# Patient Record
Sex: FEMALE | Race: BLACK OR AFRICAN AMERICAN | NOT HISPANIC OR LATINO | Employment: FULL TIME | URBAN - METROPOLITAN AREA
[De-identification: names, ages, dates, MRNs, and addresses within clinical notes are randomized per-mention and may not be internally consistent; named-entity substitution may affect disease eponyms.]

---

## 2019-04-27 ENCOUNTER — HOSPITAL ENCOUNTER (EMERGENCY)
Facility: HOSPITAL | Age: 44
Discharge: HOME | End: 2019-04-27
Attending: EMERGENCY MEDICINE
Payer: COMMERCIAL

## 2019-04-27 VITALS
RESPIRATION RATE: 15 BRPM | OXYGEN SATURATION: 100 % | HEART RATE: 60 BPM | HEIGHT: 60 IN | BODY MASS INDEX: 30.23 KG/M2 | WEIGHT: 154 LBS | TEMPERATURE: 98 F

## 2019-04-27 DIAGNOSIS — Z51.89 VISIT FOR WOUND CHECK: Primary | ICD-10-CM

## 2019-04-27 PROCEDURE — 99282 EMERGENCY DEPT VISIT SF MDM: CPT

## 2019-04-27 PROCEDURE — 99282 EMERGENCY DEPT VISIT SF MDM: CPT | Performed by: PHYSICIAN ASSISTANT

## 2019-04-27 RX ORDER — FOLIC ACID 1 MG/1
1 TABLET ORAL DAILY
COMMUNITY

## 2020-12-23 ENCOUNTER — APPOINTMENT (EMERGENCY)
Dept: RADIOLOGY | Facility: HOSPITAL | Age: 45
End: 2020-12-23
Payer: COMMERCIAL

## 2020-12-23 ENCOUNTER — HOSPITAL ENCOUNTER (EMERGENCY)
Facility: HOSPITAL | Age: 45
Discharge: HOME | End: 2020-12-23
Attending: EMERGENCY MEDICINE
Payer: COMMERCIAL

## 2020-12-23 VITALS
WEIGHT: 158 LBS | HEART RATE: 72 BPM | OXYGEN SATURATION: 98 % | BODY MASS INDEX: 30.86 KG/M2 | TEMPERATURE: 101.6 F | RESPIRATION RATE: 20 BRPM | DIASTOLIC BLOOD PRESSURE: 78 MMHG | SYSTOLIC BLOOD PRESSURE: 131 MMHG

## 2020-12-23 DIAGNOSIS — U07.1 COVID-19: Primary | ICD-10-CM

## 2020-12-23 LAB
ALBUMIN SERPL BCP-MCNC: 2.9 G/DL (ref 3.5–5)
ALP SERPL-CCNC: 50 U/L (ref 46–116)
ALT SERPL W P-5'-P-CCNC: 40 U/L (ref 12–78)
ANION GAP SERPL CALCULATED.3IONS-SCNC: 7 MMOL/L (ref 4–13)
AST SERPL W P-5'-P-CCNC: 51 U/L (ref 5–45)
BASOPHILS # BLD AUTO: 0.01 THOUSANDS/ΜL (ref 0–0.1)
BASOPHILS NFR BLD AUTO: 0 % (ref 0–1)
BILIRUB SERPL-MCNC: 0.5 MG/DL (ref 0.2–1)
BUN SERPL-MCNC: 8 MG/DL (ref 5–25)
CALCIUM ALBUM COR SERPL-MCNC: 9.1 MG/DL (ref 8.3–10.1)
CALCIUM SERPL-MCNC: 8.2 MG/DL (ref 8.3–10.1)
CHLORIDE SERPL-SCNC: 105 MMOL/L (ref 100–108)
CO2 SERPL-SCNC: 27 MMOL/L (ref 21–32)
CREAT SERPL-MCNC: 1.13 MG/DL (ref 0.6–1.3)
EOSINOPHIL # BLD AUTO: 0 THOUSAND/ΜL (ref 0–0.61)
EOSINOPHIL NFR BLD AUTO: 0 % (ref 0–6)
ERYTHROCYTE [DISTWIDTH] IN BLOOD BY AUTOMATED COUNT: 14.4 % (ref 11.6–15.1)
GFR SERPL CREATININE-BSD FRML MDRD: 67 ML/MIN/1.73SQ M
GLUCOSE SERPL-MCNC: 96 MG/DL (ref 65–140)
HCT VFR BLD AUTO: 35.8 % (ref 34.8–46.1)
HGB BLD-MCNC: 12.1 G/DL (ref 11.5–15.4)
IMM GRANULOCYTES # BLD AUTO: 0.02 THOUSAND/UL (ref 0–0.2)
IMM GRANULOCYTES NFR BLD AUTO: 0 % (ref 0–2)
LYMPHOCYTES # BLD AUTO: 1.1 THOUSANDS/ΜL (ref 0.6–4.47)
LYMPHOCYTES NFR BLD AUTO: 24 % (ref 14–44)
MCH RBC QN AUTO: 31.5 PG (ref 26.8–34.3)
MCHC RBC AUTO-ENTMCNC: 33.8 G/DL (ref 31.4–37.4)
MCV RBC AUTO: 93 FL (ref 82–98)
MONOCYTES # BLD AUTO: 0.42 THOUSAND/ΜL (ref 0.17–1.22)
MONOCYTES NFR BLD AUTO: 9 % (ref 4–12)
NEUTROPHILS # BLD AUTO: 3.08 THOUSANDS/ΜL (ref 1.85–7.62)
NEUTS SEG NFR BLD AUTO: 67 % (ref 43–75)
NRBC BLD AUTO-RTO: 0 /100 WBCS
PLATELET # BLD AUTO: 219 THOUSANDS/UL (ref 149–390)
PMV BLD AUTO: 9.5 FL (ref 8.9–12.7)
POTASSIUM SERPL-SCNC: 3.7 MMOL/L (ref 3.5–5.3)
PROT SERPL-MCNC: 8.5 G/DL (ref 6.4–8.2)
RBC # BLD AUTO: 3.84 MILLION/UL (ref 3.81–5.12)
SODIUM SERPL-SCNC: 139 MMOL/L (ref 136–145)
TROPONIN I SERPL-MCNC: <0.02 NG/ML
WBC # BLD AUTO: 4.63 THOUSAND/UL (ref 4.31–10.16)

## 2020-12-23 PROCEDURE — 80053 COMPREHEN METABOLIC PANEL: CPT | Performed by: EMERGENCY MEDICINE

## 2020-12-23 PROCEDURE — 93005 ELECTROCARDIOGRAM TRACING: CPT

## 2020-12-23 PROCEDURE — 36415 COLL VENOUS BLD VENIPUNCTURE: CPT | Performed by: EMERGENCY MEDICINE

## 2020-12-23 PROCEDURE — 71045 X-RAY EXAM CHEST 1 VIEW: CPT

## 2020-12-23 PROCEDURE — 99285 EMERGENCY DEPT VISIT HI MDM: CPT

## 2020-12-23 PROCEDURE — 84484 ASSAY OF TROPONIN QUANT: CPT | Performed by: EMERGENCY MEDICINE

## 2020-12-23 PROCEDURE — 99284 EMERGENCY DEPT VISIT MOD MDM: CPT | Performed by: EMERGENCY MEDICINE

## 2020-12-23 PROCEDURE — 85025 COMPLETE CBC W/AUTO DIFF WBC: CPT | Performed by: EMERGENCY MEDICINE

## 2020-12-23 RX ORDER — ACETAMINOPHEN 325 MG/1
975 TABLET ORAL ONCE
Status: COMPLETED | OUTPATIENT
Start: 2020-12-23 | End: 2020-12-23

## 2020-12-23 RX ORDER — DOXYCYCLINE HYCLATE 100 MG/1
100 CAPSULE ORAL 2 TIMES DAILY
Qty: 14 CAPSULE | Refills: 0 | Status: SHIPPED | OUTPATIENT
Start: 2020-12-23 | End: 2020-12-30

## 2020-12-23 RX ADMIN — ACETAMINOPHEN 975 MG: 325 TABLET, FILM COATED ORAL at 17:16

## 2020-12-24 ENCOUNTER — TELEMEDICINE (OUTPATIENT)
Dept: INTERNAL MEDICINE CLINIC | Facility: CLINIC | Age: 45
End: 2020-12-24
Payer: COMMERCIAL

## 2020-12-24 ENCOUNTER — TELEPHONE (OUTPATIENT)
Dept: INTERNAL MEDICINE CLINIC | Facility: CLINIC | Age: 45
End: 2020-12-24

## 2020-12-24 VITALS — WEIGHT: 158 LBS | BODY MASS INDEX: 29.83 KG/M2 | HEIGHT: 61 IN

## 2020-12-24 DIAGNOSIS — U07.1 COVID-19: Primary | ICD-10-CM

## 2020-12-24 PROBLEM — M17.0 OSTEOARTHRITIS OF BOTH KNEES: Status: ACTIVE | Noted: 2019-03-07

## 2020-12-24 LAB
ATRIAL RATE: 79 BPM
P AXIS: 59 DEGREES
PR INTERVAL: 148 MS
QRS AXIS: 43 DEGREES
QRSD INTERVAL: 74 MS
QT INTERVAL: 360 MS
QTC INTERVAL: 412 MS
T WAVE AXIS: 58 DEGREES
VENTRICULAR RATE: 79 BPM

## 2020-12-24 PROCEDURE — 99213 OFFICE O/P EST LOW 20 MIN: CPT | Performed by: INTERNAL MEDICINE

## 2020-12-24 PROCEDURE — 3008F BODY MASS INDEX DOCD: CPT | Performed by: INTERNAL MEDICINE

## 2020-12-24 PROCEDURE — 93010 ELECTROCARDIOGRAM REPORT: CPT | Performed by: INTERNAL MEDICINE

## 2020-12-24 PROCEDURE — 3725F SCREEN DEPRESSION PERFORMED: CPT | Performed by: INTERNAL MEDICINE

## 2020-12-28 ENCOUNTER — TELEPHONE (OUTPATIENT)
Dept: INTERNAL MEDICINE CLINIC | Facility: CLINIC | Age: 45
End: 2020-12-28

## 2020-12-28 ENCOUNTER — TELEMEDICINE (OUTPATIENT)
Dept: INTERNAL MEDICINE CLINIC | Facility: CLINIC | Age: 45
End: 2020-12-28
Payer: COMMERCIAL

## 2020-12-28 DIAGNOSIS — U07.1 COVID-19: Primary | ICD-10-CM

## 2020-12-28 PROCEDURE — 99213 OFFICE O/P EST LOW 20 MIN: CPT | Performed by: INTERNAL MEDICINE

## 2021-01-04 ENCOUNTER — TELEPHONE (OUTPATIENT)
Dept: INTERNAL MEDICINE CLINIC | Facility: CLINIC | Age: 46
End: 2021-01-04

## 2021-01-04 NOTE — TELEPHONE ENCOUNTER
Patient is faxing a form that needs to be completed by Dr Enrcio Bro for her return to work      Also patient needs the last letter that was done for her re-faxed to [phone]

## 2021-01-06 ENCOUNTER — TELEPHONE (OUTPATIENT)
Dept: INTERNAL MEDICINE CLINIC | Facility: CLINIC | Age: 46
End: 2021-01-06

## 2021-03-03 ENCOUNTER — TELEPHONE (OUTPATIENT)
Dept: INTERNAL MEDICINE CLINIC | Facility: CLINIC | Age: 46
End: 2021-03-03

## 2021-03-03 NOTE — TELEPHONE ENCOUNTER
Pt has trouble sleeping, fatigue  First available appt 4/13/2021 to establish  FYI The pt has had 2 virtual appt due to Covid in December

## 2021-03-03 NOTE — TELEPHONE ENCOUNTER
[id number] Emi Pierson will have to discuss this at appointment as it could be a persistent effect from covid

## 2021-04-13 ENCOUNTER — OFFICE VISIT (OUTPATIENT)
Dept: INTERNAL MEDICINE CLINIC | Facility: CLINIC | Age: 46
End: 2021-04-13
Payer: COMMERCIAL

## 2021-04-13 VITALS
TEMPERATURE: 98 F | DIASTOLIC BLOOD PRESSURE: 78 MMHG | BODY MASS INDEX: 31.19 KG/M2 | WEIGHT: 165.2 LBS | OXYGEN SATURATION: 99 % | HEART RATE: 74 BPM | HEIGHT: 61 IN | SYSTOLIC BLOOD PRESSURE: 118 MMHG

## 2021-04-13 DIAGNOSIS — Z86.16 PERSONAL HISTORY OF COVID-19: ICD-10-CM

## 2021-04-13 DIAGNOSIS — M05.79 RHEUMATOID ARTHRITIS INVOLVING MULTIPLE SITES WITH POSITIVE RHEUMATOID FACTOR (HCC): Primary | ICD-10-CM

## 2021-04-13 PROCEDURE — 3008F BODY MASS INDEX DOCD: CPT | Performed by: INTERNAL MEDICINE

## 2021-04-13 PROCEDURE — 99214 OFFICE O/P EST MOD 30 MIN: CPT | Performed by: INTERNAL MEDICINE

## 2021-04-13 PROCEDURE — 1036F TOBACCO NON-USER: CPT | Performed by: INTERNAL MEDICINE

## 2021-04-13 RX ORDER — FAMCICLOVIR 500 MG/1
TABLET, FILM COATED ORAL
COMMUNITY
Start: 2021-02-17

## 2021-04-13 NOTE — PROGRESS NOTES
[facility]    NAME: Renetta Collins  AGE: 48 y o  SEX: female  : 1975     DATE: 2021     Assessment and Plan:     1  Rheumatoid arthritis involving multiple sites with positive rheumatoid factor ([address] )    Follow-up with rheumatology as scheduled  Continue MTX  Disease appears to be stable at this time  Check labs before appointment with rheumatology  - CBC; Future  - Comprehensive metabolic panel; Future  - Sedimentation rate, automated; Future  - C-reactive protein; Future  - Folate; Future  - RF Screen w/ Reflex to Titer; Future  - EKLLI Screen w/ Reflex to Titer/Pattern; Future    2  Personal history of COVID-19    She has recovered well from COVID-19 and was vaccinated with J [employer] J vaccine in beginning of March  BMI Counseling: Body mass index is 31 21 kg/m²  The BMI is above normal  Nutrition recommendations include decreasing portion sizes, encouraging healthy choices of fruits and vegetables, limiting drinks that contain sugar, moderation in carbohydrate intake and increasing intake of lean protein  Exercise recommendations include exercising 3-5 times per week  Return in about 1 year (around 2022) for Annual Physical      Chief Complaint:     Chief Complaint   Patient presents with    [facility]        History of Present Illness:     Patient being seen for first time in office  Was previously seen during telemedicine visit due to COVID-19  She has recovered well from COVID-19 and got the J & J vaccine in beginning of March  No problems with the vaccine other then arm soreness  She follows with rheumatology in [place]  Transferring to Dr Alma Rosa Byrne in  at [facility]  Currently maintained on MTX  + Anti-CCP in the past and +KELLI from labs at DR GIRISH WARREN [facility]  Denies any worsening joint pain or joint swelling       Review of Systems:     Review of Systems   Constitutional: Negative for activity change, appetite change and fatigue  Respiratory: Negative for apnea, cough, chest tightness, shortness of breath and wheezing  Cardiovascular: Negative for chest pain, palpitations and leg swelling  Gastrointestinal: Negative for abdominal distention, abdominal pain, blood in stool, constipation, diarrhea, nausea and vomiting  Musculoskeletal: Positive for arthralgias  Negative for back pain, gait problem, joint swelling and myalgias  Neurological: Negative for dizziness, weakness, light-headedness, numbness and headaches  Psychiatric/Behavioral: Negative for behavioral problems, confusion, hallucinations, sleep disturbance and suicidal ideas  The patient is not nervous/anxious  Objective:     /78   Pulse 74   Temp 98 °F (36 7 °C)   Ht 5' 1" (1 549 m)   Wt 74 9 kg (165 lb 3 2 oz)   SpO2 99%   BMI 31 21 kg/m²     Physical Exam  Vitals signs reviewed  Constitutional:       General: She is not in acute distress  Appearance: She is well-developed  She is not diaphoretic  Eyes:      General: No scleral icterus  Right eye: No discharge  Left eye: No discharge  Conjunctiva/sclera: Conjunctivae normal    Neck:      Musculoskeletal: Neck supple  Thyroid: No thyromegaly  Vascular: No JVD  Cardiovascular:      Rate and Rhythm: Normal rate and regular rhythm  Heart sounds: Normal heart sounds  No murmur  Pulmonary:      Effort: Pulmonary effort is normal  No respiratory distress  Breath sounds: Normal breath sounds  No wheezing or rales  Abdominal:      General: Bowel sounds are normal  There is no distension  Palpations: Abdomen is soft  Tenderness: There is no abdominal tenderness  Musculoskeletal:         General: Deformity (knee crepitus) present  Right lower leg: No edema  Left lower leg: No edema  Lymphadenopathy:      Cervical: No cervical adenopathy  Skin:     General: Skin is warm and dry     Neurological:      Mental Status: She is alert    Psychiatric:         Mood and Affect: Mood normal          Behavior: Behavior normal        Marnie Sweeney   MEDICAL [address]

## 2021-04-13 NOTE — PATIENT INSTRUCTIONS
Rheumatoid Arthritis   WHAT YOU NEED TO KNOW:   Rheumatoid arthritis (RA) is a long-term autoimmune disease that causes inflammation and damage to your joints  RA causes your body's immune system to attack the synovial membrane (lining) in your joints  RA can also affect other organs, such as your eyes, heart, or lungs  It may also increase your risk of osteoporosis (weakened bones)  DISCHARGE INSTRUCTIONS:   Call your doctor or rheumatologist if:   · You have a fever  · You have increased joint swelling, pain, or redness  · Your skin is itchy, swollen, or has a rash  · Your symptoms are getting worse, even with treatment  · You have questions or concerns about your condition or care  Medicines:   · Antirheumatics  help slow the progress of RA, and reduce pain, stiffness, and inflammation  · NSAIDs , such as ibuprofen, help decrease swelling, pain, and fever  NSAIDs can cause stomach bleeding or kidney problems in certain people  If you take blood thinner medicine, always ask your healthcare provider if NSAIDs are safe for you  Always read the medicine label and follow directions  · Biologic therapy  helps your immune system fight the disease  These medicines increase the risk of serious infection and need careful monitoring  · Steroid medicine  helps reduce swelling and pain  · Take your medicine as directed  Contact your healthcare provider if you think your medicine is not helping or if you have side effects  Tell him of her if you are allergic to any medicine  Keep a list of the medicines, vitamins, and herbs you take  Include the amounts, and when and why you take them  Bring the list or the pill bottles to follow-up visits  Carry your medicine list with you in case of an emergency  Follow up with your healthcare provider or rheumatologist as directed:  Write down your questions so you remember to ask them during your visits  Manage your symptoms:   · Rest when needed    Rest is important if your joints are painful  Limit your activities until your symptoms improve  Gradually start your normal activities when you can do them without pain  Avoid motions and activities that cause strain on your joints, such as heavy exercise and lifting  · Use ice or heat  Both can help decrease swelling and pain  Ice may also help prevent tissue damage  Use an ice pack, or put crushed ice in a plastic bag  Cover it with a towel and place it on your joint for 15 to 20 minutes every hour or as directed  You can apply heat for 20 minutes every 2 hours  Heat treatment includes hot packs, heat lamps, warm baths, or showers  · Elevate your joint  Elevation helps reduce swelling and pain  Raise your joint above the level of your heart as often as you can  Prop your painful joint on pillows to keep it above your heart comfortably  Manage RA:   · Talk to your healthcare providers about your arthritis medicines  Some medicines may only be needed when you have arthritis pain  You may need to take other medicines every day to prevent arthritis from getting worse  Your healthcare providers will help you understand all your medicines and when to take them  It is important to take the medicines as directed, even if you start to feel better  You can continue to have joint damage and inflammation even if you do not feel it  · Eat a variety of healthy foods  Healthy foods include fruits, vegetables, whole-grain breads, low-fat dairy products, beans, lean meats, and fish  Ask if you need to be on a special diet  A diet rich in calcium and vitamin D may decrease your risk of osteoporosis  Foods high in calcium include milk, cheese, broccoli, and tofu  Vitamin D may be found in meat, fish, fortified milk, cereal and bread  Ask if you need calcium or vitamin D supplements  · Maintain a healthy weight  This may help decrease strain on joints in your back, knees, ankles, and feet   Ask your healthcare provider how much you should weigh  Ask him or her to help you create a weight loss plan if you are overweight  Exercise can help you maintain a healthy weight  · Go to physical or occupational therapy as directed  Physical activity can help relieve pain and stiffness  A physical therapist can teach you exercises to improve flexibility and range of motion  You may also be shown non-weight-bearing exercises that are safe for your joints, such as swimming  An occupational therapist can help you learn to do your daily activities when your joints are stiff or sore  · Do not smoke  Nicotine and other chemicals in cigarettes and cigars can damage your bones and joints  Ask your healthcare provider for information if you currently smoke and need help to quit  E-cigarettes or smokeless tobacco still contain nicotine  Talk to your healthcare provider before you use these products  RA medicines and pregnancy:   · Men:  Talk to your doctor about your RA and the medicines you take  Some medicines may keep your partner from getting pregnant  Talk to your doctor if you and your partner are discussing pregnancy  · Women:  Talk to your gynecologist about contraceptives  Tell him or her about your RA and what medicines you take  He or she will tell you what the best contraceptive for will be  Not all contraceptives are effective if you have RA and are taking certain medicines  You may not be able to breastfeed if you are taking certain RA medicines  Support devices to help manage arthritis:   · Orthotic shoes or insoles  help support your feet when you walk  · Crutches, a cane, or a walker  may help decrease your risk for falling  They also decrease stress on affected joints  · Devices to prevent falls  include raised toilet seats and bathtub bars to help you get up from sitting  Handrails can be placed in areas where you need balance and support      · Devices to help with support and rest  include splints to wear on your hands and a firm pillow while you sleep  Use a pillow that is firm enough to support your neck and head  Ask your healthcare provider about vaccines: You may be at an increased risk for infections due to RA and its treatment  Vaccines may prevent infections from various viruses  © Copyright [address] Information is for End User's use only and may not be sold, redistributed or otherwise used for commercial purposes  All illustrations and images included in CareNotes® are the copyrighted property of A D A M , Inc  or [facility] Yissel Mata   The above information is an  only  It is not intended as medical advice for individual conditions or treatments  Talk to your doctor, nurse or pharmacist before following any medical regimen to see if it is safe and effective for you

## 2021-06-10 ENCOUNTER — OFFICE VISIT (OUTPATIENT)
Dept: INTERNAL MEDICINE CLINIC | Facility: CLINIC | Age: 46
End: 2021-06-10
Payer: COMMERCIAL

## 2021-06-10 VITALS
SYSTOLIC BLOOD PRESSURE: 116 MMHG | HEART RATE: 73 BPM | BODY MASS INDEX: 31.45 KG/M2 | DIASTOLIC BLOOD PRESSURE: 76 MMHG | HEIGHT: 61 IN | WEIGHT: 166.6 LBS | TEMPERATURE: 97.6 F | OXYGEN SATURATION: 98 %

## 2021-06-10 DIAGNOSIS — R10.9 STOMACH PAIN: Primary | ICD-10-CM

## 2021-06-10 PROCEDURE — 3008F BODY MASS INDEX DOCD: CPT | Performed by: INTERNAL MEDICINE

## 2021-06-10 PROCEDURE — 99213 OFFICE O/P EST LOW 20 MIN: CPT | Performed by: NURSE PRACTITIONER

## 2021-06-10 RX ORDER — PANTOPRAZOLE SODIUM 40 MG/1
40 TABLET, DELAYED RELEASE ORAL
Qty: 30 TABLET | Refills: 2 | Status: SHIPPED | OUTPATIENT
Start: 2021-06-10 | End: 2021-09-01

## 2021-06-10 RX ORDER — SUCRALFATE ORAL 1 G/10ML
1 SUSPENSION ORAL 4 TIMES DAILY
Qty: 420 ML | Refills: 0 | Status: SHIPPED | OUTPATIENT
Start: 2021-06-10 | End: 2021-10-19 | Stop reason: SDUPTHER

## 2021-06-10 NOTE — PROGRESS NOTES
INTERNAL MEDICINE FOLLOW-UP OFFICE VISIT  [facility]    NAME: Devyn Dorantes  AGE: 48 y o  SEX: female    DATE OF ENCOUNTER: 6/10/2021   Assessment and Plan:   Patient with symptoms of gastric ulcer  Will start pantoprazole daily and carafate qid prn  Advised if no improvement in 1-2 weeks please call back for GI referral for possible EGD  Advised to monitor fruit intake to see if any specific foods trigger her symptoms more than others  Problem List Items Addressed This Visit     None      Visit Diagnoses     Stomach pain    -  Primary    Relevant Medications    pantoprazole (PROTONIX) 40 mg tablet    sucralfate (CARAFATE) 1 g/10 mL suspension          No follow-ups on file  Counseling:     · Medication Side Effects - Adverse side effects of medications were reviewed with the patient/guardian today: Yes  · Counseling was given regarding: Prognosis, Risks and benefits of tx options, Intructions for management, Patient and family education, Importance of tx compliance, Risk factor reductions and Impressions  · Barriers to treatment include: No identified barriers      Chief Complaint:     Chief Complaint   Patient presents with    GI Problem     gas pain when she eats on and off  has been going on for a week        History of Present Illness:     Has had stomach pains for the past week and not sure what is causing this  States she gained about 20 lbs during covid and is trying to lose weight  She has been eating a lot more fruits lately, apples, grape, blueberries, tangerines  She denies spicy foods or excess caffeine  She states she did go back to work full time and is still working a part time job but denies significant stress  States when she eats she gets severe discomfort in her stomach  Has tried drinking ginger ale and gas x tablets without relief  States sometimes she bends down on all fours to relieve the pain   Denies acid reflux, no n/v/d, or constipation  Had fever of 102 and chills for one night two days ago but then this resolved  Was unsure if she could get covid again since she had this in December and recently had her vaccine in march of this year  Feels otherwise well today and fever has not returned  The following portions of the patient's history were reviewed and updated as appropriate: allergies, current medications, past family history, past medical history, past social history, past surgical history and problem list      Review of Systems:     Review of Systems   Constitutional: Negative for chills, fatigue and fever  Respiratory: Negative for shortness of breath  Cardiovascular: Negative for chest pain  Gastrointestinal: Positive for abdominal pain  Negative for abdominal distention, constipation, diarrhea, nausea and vomiting  Genitourinary: Negative for dysuria  Musculoskeletal: Negative for back pain  Problem List:     Patient Active Problem List   Diagnosis    Rheumatoid arthritis ([address] )    Osteoarthritis of both knees        Objective:     /76 (BP Location: Left arm, Patient Position: Sitting, Cuff Size: Standard)   Pulse 73   Temp 97 6 °F (36 4 °C) Comment: no nsaids  Ht 5' 1" (1 549 m)   Wt 75 6 kg (166 lb 9 6 oz)   SpO2 98%   BMI 31 48 kg/m²     Physical Exam  Vitals signs reviewed  Constitutional:       General: She is not in acute distress  Appearance: Normal appearance  She is not ill-appearing  HENT:      Head: Normocephalic and atraumatic  Cardiovascular:      Rate and Rhythm: Normal rate and regular rhythm  Heart sounds: Normal heart sounds, S1 normal and S2 normal    Pulmonary:      Effort: Pulmonary effort is normal  No accessory muscle usage  Breath sounds: Normal breath sounds  No wheezing  Abdominal:      General: Abdomen is flat  Bowel sounds are increased  Palpations: Abdomen is soft  Tenderness: There is no guarding        Hernia: No hernia is present  Skin:     General: Skin is warm and dry  Capillary Refill: Capillary refill takes less than 2 seconds  Findings: No rash  Neurological:      General: No focal deficit present  Mental Status: She is alert and oriented to person, place, and time  Motor: Motor function is intact  Psychiatric:         Attention and Perception: Attention and perception normal          Mood and Affect: Mood and affect normal          Speech: Speech normal          Behavior: Behavior normal  Behavior is cooperative  Thought Content: Thought content normal          Pertinent Laboratory/Diagnostic Studies:    Laboratory Results: I have personally reviewed the pertinent laboratory results/reports   Radiology/Other Diagnostic Testing Results: I have personally reviewed pertinent reports  Current Medications:     Current Outpatient Medications   Medication Sig Dispense Refill    Diclofenac Sodium (VOLTAREN) 1 % Apply 2 g topically 4 (four) times a day as needed      famciclovir (FAMVIR) 500 mg tablet TAKE 2 TABLETS BY MOUTH IN THE AM AND 2 TABS BY MOUTH AT PM FOR ONE DAY THEN REPEAT AS NEEDED      folic acid (FOLVITE) 1 mg tablet Take 1 mg by mouth daily      methotrexate 2 5 mg tablet Take 2 5 mg by mouth once a week      pantoprazole (PROTONIX) 40 mg tablet Take 1 tablet (40 mg total) by mouth daily before breakfast 30 tablet 2    sucralfate (CARAFATE) 1 g/10 mL suspension Take 10 mL (1 g total) by mouth 4 (four) times a day 420 mL 0     No current facility-administered medications for this visit  Patient Instructions   Start taking pantoprazole daily, if no improvement in 1-2 weeks please call for GI referral  Can take carafate 4 times daily before meals to help with stomach pains         SALVADOR Hilton  [facility]

## 2021-06-10 NOTE — LETTER
Esperanza 10, 2021     Patient: Florence Galvez   YOB: 1975   Date of Visit: 6/10/2021       To Whom it May Concern:    Florence Galvez is under my professional care  She was seen in my office on 6/10/2021  She may return to work on 6/11/2021  If you have any questions or concerns, please don't hesitate to call           Sincerely,          SALVADOR Nails

## 2021-06-13 ENCOUNTER — TELEPHONE (OUTPATIENT)
Dept: RHEUMATOLOGY | Facility: CLINIC | Age: 46
End: 2021-06-13

## 2021-06-13 NOTE — TELEPHONE ENCOUNTER
Patient recently saw her rheum and has a follow up scheduled with them - please check if she is still seeing me as a new patient, thanks

## 2021-06-14 NOTE — PROGRESS NOTES
Assessment and Plan:   Ms Deepthi Og is a 78-year-old female with history significant for seropositive rheumatoid arthritis (rheumatoid factor of 25 and anti CCP antibody greater than 250), positive KELLI 1:320 nucleolar pattern and SSA antibody greater than 8, who presents to establish with [facility]umat[facility]  She is currently on methotrexate 15 mg once weekly  She is transferring care from DR GIRISH WARREN [facility] Rhe[facility]ology  She is referred today by Dr Skye Sweet for a rheumatology consult  - [address] presents today to establish with [facility] for continued management of seropositive rheumatoid arthritis with which she was diagnosed in approximately 2015  She has been well managed with oral methotrexate at 15 mg once weekly since the time of her diagnosis and denies any symptoms that would be concerning for a flare-up of the rheumatoid arthritis  Her physical examination is also unrevealing today with absence of synovitis  Overall she appears to be in remission and we will continue with the methotrexate at 15 mg once weekly with folic acid 1 mg daily  She is due for high risk medication lab monitoring today and will repeat this again in 4 months  - Based on records from her prior rheumatologist she was also found to have a positive KELLI nucleolar pattern and high titer SSA antibody  She does not report features that would be concerning for an underlying connective tissue disorder except for the dry eyes which may also be secondary to contact lens use  We will continue to monitor this for now  Plan:  Diagnoses and all orders for this visit:    Seropositive rheumatoid arthritis ([facility] [address] )  -     methotrexate 2 5 mg tablet; Take 6 tablets (15 mg total) by mouth once a week    Methotrexate, long term, current use  -     CBC and differential; Future  -     Comprehensive metabolic panel; Future  -     C-reactive protein; Future  -     Sedimentation rate, automated;  Future  -     Chronic Hepatitis Panel; Future  -     CBC and differential; Future  -     Comprehensive metabolic panel; Future    Positive KELLI (antinuclear antibody)    SS-A antibody positive    History of COVID-19    Other orders  -     Cancel: Quantiferon TB Gold Plus; Future  -     guaifenesin-codeine (GUAIFENESIN AC) 100-10 MG/5ML liquid; Virtussin AC 10 mg-100 mg/5 mL oral liquid      I have personally reviewed pertinent films in PACS of the CXR which does not show any osseous abnormalities  Activities as tolerated  Exercise: try to maintain a low impact exercise regimen as much as possible  Walk for 30 minutes a day for at least 3 days a week  Continue other medications as prescribed by PCP and other specialists  RTC in 8 months  HPI  Ms Kennedy Segovia is a 40-year-old female with history significant for seropositive rheumatoid arthritis (rheumatoid factor of 25 and anti CCP antibody greater than 250), positive KELLI 1:320 nucleolar pattern and SSA antibody greater than 8, who presents to establish with [facility] Rheumat[facility]  She is currently on methotrexate 15 mg once weekly  She is transferring care from DR GIRISH WARREN [facility] Rheumatology  She is referred today by Dr Nidhi Nix for a rheumatology consult  Patient reports she was diagnosed with rheumatoid arthritis approximately 6 years ago and was started on oral methotrexate as the initial option  She has been maintained on methotrexate at 15 mg once weekly since her diagnosis and reports that this significantly helps with her symptoms  She is currently denying any joint pains, swelling or morning stiffness  She has been tolerating the methotrexate well without any concerns for side effects or infections  She reports due to the pandemic she was unable to schedule a follow-up with her rheumatologist and temporarily was off the medication for 3 months due to a lack of refills    During this time she noticed worsening of her joint pains which rapidly improved after restarting the methotrexate  She will take prednisone very infrequently for such occasions and this helps with her symptoms  She does not utilize any over-the-counter pain medications  She denies fevers, unintentional weight loss, inflammatory eye disease (she does report dry eyes for which she uses topical lubricants as needed), dry mouth, skin rash, psoriasis, photosensitivity (reports at times with sun exposure she may develop a prickly heat type rash on her arms), skin thickening/tightening, mouth/nose ulcers, swollen glands, dysphagia, persistent GERD, pleuritic chest pain, shortness of breath, inflammatory bowel disease, blood clots, miscarriages or Raynaud's  She reports a history of rheumatoid arthritis in a sister and lupus in a sister  The following portions of the patient's history were reviewed and updated as appropriate: allergies, current medications, past family history, past medical history, past social history, past surgical history and problem list       Review of Systems  Constitutional: Negative for weight change, fevers, chills, night sweats, fatigue  ENT/Mouth: Negative for hearing changes, ear pain, nasal congestion, sinus pain, hoarseness, sore throat, rhinorrhea, swallowing difficulty  Eyes: Negative for pain, redness, discharge, vision changes  Cardiovascular: Negative for chest pain, SOB, palpitations  Respiratory: Negative for cough, sputum, wheezing, dyspnea  Gastrointestinal: Negative for nausea, vomiting, diarrhea, constipation, pain, heartburn  Genitourinary: Negative for dysuria, urinary frequency, hematuria  Musculoskeletal: As per HPI  Skin: Negative for skin rash, color changes  Neuro: Negative for weakness, numbness, tingling, loss of consciousness  Psych: Negative for anxiety, depression  Heme/Lymph: Negative for easy bruising, bleeding, lymphadenopathy          Past Medical History:   Diagnosis Date    [facility] Past Surgical History:   Procedure Laterality Date    BRACHIOPLASTY         Social History     Socioeconomic History    Marital status: Single     Spouse name: Not on file    Number of children: Not on file    Years of education: Not on file    Highest education level: Not on file   Occupational History    Not on file   Tobacco Use    Smoking status: Never Smoker    Smokeless tobacco: Never Used   Vaping Use    Vaping Use: Never used   Substance and Sexual Activity    Alcohol use: Not Currently    Drug use: Not Currently    Sexual activity: Not Currently   Other Topics Concern    Not on file   Social History Narrative    Not on file     Social Determinants of Health     Financial Resource Strain:     Difficulty of Paying Living Expenses:    Food Insecurity:     Worried About Running Out of Food in the Last Year:     [address] in the Last Year:    Transportation Needs:     Lack of Transportation (Medical):      Lack of Transportation (Non-Medical):    Physical Activity: Inactive    Days of Exercise per Week: 0 days    Minutes of Exercise per Session: 0 min   Stress: No Stress Concern Present    Feeling of Stress : Not at all   Social Connections:     Frequency of Communication with Friends and Family:     Frequency of Social Gatherings with Friends and Family:     Attends Sabianist Services:     Active Member of Clubs or Organizations:     Attends Club or Organization Meetings:     Marital Status:    Intimate Partner Violence:     Fear of Current or Ex-Partner:     Emotionally Abused:     Physically Abused:     Sexually Abused:        Family History   Problem Relation Age of Onset    Hypertension Mother        No Known Allergies      Current Outpatient Medications:     Diclofenac Sodium (VOLTAREN) 1 %, Apply 2 g topically 4 (four) times a day as needed, Disp: , Rfl:     famciclovir (FAMVIR) 500 mg tablet, TAKE 2 TABLETS BY MOUTH IN THE AM AND 2 TABS BY MOUTH AT PM FOR ONE DAY THEN REPEAT AS NEEDED, Disp: , Rfl:     folic acid (FOLVITE) 1 mg tablet, Take 1 mg by mouth daily, Disp: , Rfl:     guaifenesin-codeine (GUAIFENESIN AC) 100-10 MG/5ML liquid, Virtussin AC 10 mg-100 mg/5 mL oral liquid, Disp: , Rfl:     methotrexate 2 5 mg tablet, Take 6 tablets (15 mg total) by mouth once a week, Disp: 72 tablet, Rfl: 1    pantoprazole (PROTONIX) 40 mg tablet, Take 1 tablet (40 mg total) by mouth daily before breakfast, Disp: 30 tablet, Rfl: 2    sucralfate (CARAFATE) 1 g/10 mL suspension, Take 10 mL (1 g total) by mouth 4 (four) times a day, Disp: 420 mL, Rfl: 0      Objective:    Vitals:    06/15/21 0858   BP: 152/82   Pulse: 70   Weight: 75 3 kg (166 lb)       Physical Exam  General: Well appearing, well nourished, in no distress  Oriented x 3, normal mood and affect  Ambulating without difficulty  Skin: Good turgor, no rash, unusual bruising or prominent lesions  Hair: Normal texture and distribution  Nails: Normal color, no deformities  HEENT:  Head: Normocephalic, atraumatic  Eyes: Conjunctiva clear, sclera non-icteric, EOM intact  Extremities: No amputations or deformities, cyanosis, edema  Musculoskeletal: There is no peripheral joint soft tissue swelling or tenderness noted today  Neurologic: Alert and oriented  No focal neurological deficits appreciated  Psychiatric: Normal mood and affect  CELESTINA Quintero    Rheumatology

## 2021-06-15 ENCOUNTER — OFFICE VISIT (OUTPATIENT)
Dept: RHEUMATOLOGY | Facility: CLINIC | Age: 46
End: 2021-06-15
Payer: COMMERCIAL

## 2021-06-15 VITALS
DIASTOLIC BLOOD PRESSURE: 82 MMHG | BODY MASS INDEX: 31.37 KG/M2 | SYSTOLIC BLOOD PRESSURE: 152 MMHG | HEART RATE: 70 BPM | WEIGHT: 166 LBS

## 2021-06-15 DIAGNOSIS — M05.9 SEROPOSITIVE RHEUMATOID ARTHRITIS (HCC): Primary | ICD-10-CM

## 2021-06-15 DIAGNOSIS — R76.8 POSITIVE ANA (ANTINUCLEAR ANTIBODY): ICD-10-CM

## 2021-06-15 DIAGNOSIS — Z86.16 HISTORY OF COVID-19: ICD-10-CM

## 2021-06-15 DIAGNOSIS — Z79.899 METHOTREXATE, LONG TERM, CURRENT USE: ICD-10-CM

## 2021-06-15 DIAGNOSIS — R76.8 SS-A ANTIBODY POSITIVE: ICD-10-CM

## 2021-06-15 PROCEDURE — 1036F TOBACCO NON-USER: CPT | Performed by: INTERNAL MEDICINE

## 2021-06-15 PROCEDURE — 99205 OFFICE O/P NEW HI 60 MIN: CPT | Performed by: INTERNAL MEDICINE

## 2021-06-15 RX ORDER — GUAIFENESIN AND CODEINE PHOSPHATE 100; 10 MG/5ML; MG/5ML
SOLUTION ORAL
COMMUNITY

## 2021-06-15 NOTE — LETTER
Esperanza 15, 2021     Patient: Selina Cortez   YOB: 1975   Date of Visit: 6/15/2021       To Whom it May Concern:    Selina Cortez is under my professional care  She was seen in my office on 6/15/2021  She may return to work on 06/16  If you have any questions or concerns, please don't hesitate to call           Sincerely,          Herbert Dancer, MD        CC: [place]

## 2021-07-14 LAB
ALBUMIN SERPL-MCNC: 4 G/DL (ref 3.8–4.8)
ALBUMIN/GLOB SERPL: 1 {RATIO} (ref 1.2–2.2)
ALP SERPL-CCNC: 59 IU/L (ref 48–121)
ALT SERPL-CCNC: 18 IU/L (ref 0–32)
AST SERPL-CCNC: 23 IU/L (ref 0–40)
BASOPHILS # BLD AUTO: 0 X10E3/UL (ref 0–0.2)
BASOPHILS NFR BLD AUTO: 1 %
BILIRUB SERPL-MCNC: 0.3 MG/DL (ref 0–1.2)
BUN SERPL-MCNC: 17 MG/DL (ref 6–24)
BUN/CREAT SERPL: 19 (ref 9–23)
CALCIUM SERPL-MCNC: 8.8 MG/DL (ref 8.7–10.2)
CHLORIDE SERPL-SCNC: 105 MMOL/L (ref 96–106)
CO2 SERPL-SCNC: 24 MMOL/L (ref 20–29)
CREAT SERPL-MCNC: 0.88 MG/DL (ref 0.57–1)
CRP SERPL-MCNC: 4 MG/L (ref 0–10)
EOSINOPHIL # BLD AUTO: 0.1 X10E3/UL (ref 0–0.4)
EOSINOPHIL NFR BLD AUTO: 2 %
ERYTHROCYTE [DISTWIDTH] IN BLOOD BY AUTOMATED COUNT: 15.9 % (ref 11.7–15.4)
ERYTHROCYTE [SEDIMENTATION RATE] IN BLOOD BY WESTERGREN METHOD: 36 MM/HR (ref 0–32)
GLOBULIN SER-MCNC: 4.1 G/DL (ref 1.5–4.5)
GLUCOSE SERPL-MCNC: 97 MG/DL (ref 65–99)
HAV IGM SERPL QL IA: NEGATIVE
HBV CORE IGM SERPL QL IA: NEGATIVE
HBV SURFACE AG SERPL QL IA: NEGATIVE
HCT VFR BLD AUTO: 36.6 % (ref 34–46.6)
HCV AB S/CO SERPL IA: 0.1 S/CO RATIO (ref 0–0.9)
HGB BLD-MCNC: 11.9 G/DL (ref 11.1–15.9)
IMM GRANULOCYTES # BLD: 0 X10E3/UL (ref 0–0.1)
IMM GRANULOCYTES NFR BLD: 0 %
LYMPHOCYTES # BLD AUTO: 1.2 X10E3/UL (ref 0.7–3.1)
LYMPHOCYTES NFR BLD AUTO: 27 %
MCH RBC QN AUTO: 31.4 PG (ref 26.6–33)
MCHC RBC AUTO-ENTMCNC: 32.5 G/DL (ref 31.5–35.7)
MCV RBC AUTO: 97 FL (ref 79–97)
MONOCYTES # BLD AUTO: 0.7 X10E3/UL (ref 0.1–0.9)
MONOCYTES NFR BLD AUTO: 15 %
NEUTROPHILS # BLD AUTO: 2.4 X10E3/UL (ref 1.4–7)
NEUTROPHILS NFR BLD AUTO: 55 %
PLATELET # BLD AUTO: 261 X10E3/UL (ref 150–450)
POTASSIUM SERPL-SCNC: 4.1 MMOL/L (ref 3.5–5.2)
PROT SERPL-MCNC: 8.1 G/DL (ref 6–8.5)
RBC # BLD AUTO: 3.79 X10E6/UL (ref 3.77–5.28)
SL AMB EGFR AFRICAN AMERICAN: 90 ML/MIN/1.73
SL AMB EGFR NON AFRICAN AMERICAN: 78 ML/MIN/1.73
SODIUM SERPL-SCNC: 139 MMOL/L (ref 134–144)
WBC # BLD AUTO: 4.5 X10E3/UL (ref 3.4–10.8)

## 2021-09-01 DIAGNOSIS — R10.9 STOMACH PAIN: ICD-10-CM

## 2021-09-01 RX ORDER — PANTOPRAZOLE SODIUM 40 MG/1
TABLET, DELAYED RELEASE ORAL
Qty: 90 TABLET | Refills: 0 | Status: SHIPPED | OUTPATIENT
Start: 2021-09-01 | End: 2021-11-30

## 2021-10-19 DIAGNOSIS — R10.9 STOMACH PAIN: ICD-10-CM

## 2021-10-19 RX ORDER — SUCRALFATE ORAL 1 G/10ML
1 SUSPENSION ORAL 4 TIMES DAILY
Qty: 420 ML | Refills: 0 | Status: SHIPPED | OUTPATIENT
Start: 2021-10-19

## 2021-11-30 DIAGNOSIS — R10.9 STOMACH PAIN: ICD-10-CM

## 2021-11-30 RX ORDER — PANTOPRAZOLE SODIUM 40 MG/1
TABLET, DELAYED RELEASE ORAL
Qty: 90 TABLET | Refills: 0 | Status: SHIPPED | OUTPATIENT
Start: 2021-11-30 | End: 2022-02-27

## 2021-12-14 ENCOUNTER — ESTABLISHED COMPREHENSIVE EXAM (OUTPATIENT)
Dept: CLINIC 2 | Facility: CLINIC | Age: 46
End: 2021-12-14

## 2021-12-14 DIAGNOSIS — H44.23: ICD-10-CM

## 2021-12-14 DIAGNOSIS — Z01.00: ICD-10-CM

## 2021-12-14 DIAGNOSIS — H52.13: ICD-10-CM

## 2021-12-14 PROCEDURE — 92014 COMPRE OPH EXAM EST PT 1/>: CPT

## 2021-12-14 PROCEDURE — 92310 CONTACT LENS FITTING OU: CPT

## 2021-12-14 PROCEDURE — 92015 DETERMINE REFRACTIVE STATE: CPT

## 2021-12-14 ASSESSMENT — TONOMETRY
OS_IOP_MMHG: 16
OD_IOP_MMHG: 16
OD_IOP_MMHG: 18
OS_IOP_MMHG: 18

## 2021-12-14 ASSESSMENT — KERATOMETRY
OD_AXISANGLE2_DEGREES: 70
OS_AXISANGLE_DEGREES: 7
OS_AXISANGLE2_DEGREES: 97
OD_K1POWER_DIOPTERS: 45.50
OS_K2POWER_DIOPTERS: 46.75
OD_K2POWER_DIOPTERS: 46.00
OD_AXISANGLE_DEGREES: 160
OS_K1POWER_DIOPTERS: 45.00

## 2021-12-14 ASSESSMENT — VISUAL ACUITY
OU_CC: J1+
OD_CC: 20/20-1
OS_CC: 20/20

## 2022-02-22 ENCOUNTER — OFFICE VISIT (OUTPATIENT)
Dept: RHEUMATOLOGY | Facility: CLINIC | Age: 47
End: 2022-02-22
Payer: COMMERCIAL

## 2022-02-22 VITALS
DIASTOLIC BLOOD PRESSURE: 84 MMHG | WEIGHT: 167 LBS | BODY MASS INDEX: 31.55 KG/M2 | HEART RATE: 54 BPM | SYSTOLIC BLOOD PRESSURE: 118 MMHG

## 2022-02-22 DIAGNOSIS — R76.8 SS-A ANTIBODY POSITIVE: ICD-10-CM

## 2022-02-22 DIAGNOSIS — R76.8 POSITIVE ANA (ANTINUCLEAR ANTIBODY): ICD-10-CM

## 2022-02-22 DIAGNOSIS — M05.9 SEROPOSITIVE RHEUMATOID ARTHRITIS (HCC): Primary | ICD-10-CM

## 2022-02-22 DIAGNOSIS — Z79.899 METHOTREXATE, LONG TERM, CURRENT USE: ICD-10-CM

## 2022-02-22 DIAGNOSIS — Z92.241 HISTORY OF RECENT STEROID USE: ICD-10-CM

## 2022-02-22 PROCEDURE — 99215 OFFICE O/P EST HI 40 MIN: CPT | Performed by: INTERNAL MEDICINE

## 2022-02-22 PROCEDURE — 1036F TOBACCO NON-USER: CPT | Performed by: INTERNAL MEDICINE

## 2022-02-22 RX ORDER — PREDNISONE 1 MG/1
TABLET ORAL
Qty: 60 TABLET | Refills: 2 | Status: SHIPPED | OUTPATIENT
Start: 2022-02-22 | End: 2022-06-09 | Stop reason: SDUPTHER

## 2022-02-22 RX ORDER — MEDROXYPROGESTERONE ACETATE 10 MG/1
TABLET ORAL DAILY
COMMUNITY

## 2022-02-22 NOTE — PROGRESS NOTES
Assessment and Plan:   Ms Lulu Prado is a 64-year-old female with history significant for seropositive rheumatoid arthritis (rheumatoid factor of 25 and anti CCP antibody greater than 250), positive KELLI 1:320 nucleolar pattern and SSA antibody greater than 8, who presents for a follow-up  She is currently on methotrexate 15 mg once weekly  # Seropositive rheumatoid arthritis, diagnosed in 2015  - Sabiha Bartlett presents today for a follow-up of seropositive rheumatoid arthritis that is currently well controlled with oral methotrexate at 15 mg once weekly  She did experience a flare up about 1 month ago that she was able to manage with prednisone 10 mg once daily as needed but has otherwise been doing well  I advised her as she has not had recurrent flares we will continue with the same dose of methotrexate unchanged along with folic acid 1 mg daily  She may manage short flares with prednisone as needed, but if this becomes a frequent occurrence then we will discuss a change in DMARDs  She is due for high-risk medication lab monitoring today and I will also obtain baseline hand and foot x-rays      - Based on records from her prior rheumatologist she was also found to have a positive KELLI nucleolar pattern and high titer SSA antibody  She does not report features that would be concerning for an underlying connective tissue disorder except for the dry eyes which may also be secondary to contact lens use  We will continue to monitor this for now        Plan:  Diagnoses and all orders for this visit:    Seropositive [facility] ([facility] )  -     XR hand 3+ vw right; Future  -     XR hand 3+ vw left; Future  -     XR foot 3+ vw right; Future  -     XR foot 3+ vw left; Future  -     predniSONE 5 mg tablet; Take 2 tablets daily as needed for a flare up  Methotrexate, long term, current use  -     CBC and differential; Future  -     Comprehensive metabolic panel; Future  -     C-reactive protein;  Future  - Sedimentation rate, automated; Future    History of recent steroid use    Positive KELLI (antinuclear antibody)    SS-A antibody positive    Other orders  -     triamcinolone (KENALOG) 0 1 % ointment; APPLY TWICE A DAY AVOID FACE AND GROIN  -     medroxyPROGESTERone (Provera) 10 mg tablet; Daily      Activities as tolerated  Exercise: try to maintain a low impact exercise regimen as much as possible  Walk for 30 minutes a day for at least 3 days a week  Continue other medications as prescribed by PCP and other specialists  RTC in 6 months  HPI    INITIAL VISIT NOTE (6/2021):  Ms Jeanne Rios is a 53-year-old female with history significant for seropositive rheumatoid arthritis (rheumatoid factor of 25 and anti CCP antibody greater than 250), positive KELLI 1:320 nucleolar pattern and SSA antibody greater than 8, who presents to [facility] with [address] [place] [address] Rheumatology  She is currently on methotrexate 15 mg once weekly  She is transferring care from DR GIRISH WARREN [facility] Rheumatology  She is referred today by Dr Erasmo Gaxiola for a rheumatology consult        Patient reports she was diagnosed with rheumatoid arthritis approximately 6 years ago and was started on oral methotrexate as the initial option  She has been maintained on methotrexate at 15 mg once weekly since her diagnosis and reports that this significantly helps with her symptoms  She is currently denying any joint pains, swelling or morning stiffness  She has been tolerating the methotrexate well without any concerns for side effects or infections  She reports due to the pandemic she was unable to schedule a follow-up with her rheumatologist and temporarily was off the medication for 3 months due to a lack of refills  During this time she noticed worsening of her joint pains which rapidly improved after restarting the methotrexate  She will take prednisone very infrequently for such occasions and this helps with her symptoms    She does not utilize any over-the-counter pain medications      She denies fevers, unintentional weight loss, inflammatory eye disease (she does report dry eyes for which she uses topical lubricants as needed), dry mouth, skin rash, psoriasis, photosensitivity (reports at times with sun exposure she may develop a prickly heat type rash on her arms), skin thickening/tightening, mouth/nose ulcers, swollen glands, dysphagia, persistent GERD, pleuritic chest pain, shortness of breath, inflammatory bowel disease, blood clots, miscarriages or Raynaud's  She reports a history of rheumatoid arthritis in a sister and lupus in a sister  2/22/2022:  Patient presents for a follow-up of seropositive rheumatoid arthritis  She is currently on oral methotrexate 15 mg once weekly with folic acid 1 mg daily  I reviewed the labs done following the last office visit which showed an unremarkable CBC, CMP, ESR, CRP and hepatitis panel  She reports overall since the last visit she had done well except for a flare up that she experienced about a month ago affecting her right hand that lasted for about 3 weeks  She did take prednisone 10 mg once daily to help with her symptoms and has been off the steroids for a week now  The symptoms have not recurred  She had been going through a period of stress with moving houses and states and feels like this may have contributed to her flare  Currently she denies any joint pains, swelling or stiffness  She has been tolerating the methotrexate well without any concerns for side effects or infections  The following portions of the patient's history were reviewed and updated as appropriate: allergies, current medications, past family history, past medical history, past social history, past surgical history and problem list       Review of Systems  Constitutional: Negative for weight change, fevers, chills, night sweats, fatigue    ENT/Mouth: Negative for hearing changes, ear pain, nasal congestion, sinus pain, hoarseness, sore throat, rhinorrhea, swallowing difficulty  Eyes: Negative for pain, redness, discharge, vision changes  Cardiovascular: Negative for chest pain, SOB, palpitations  Respiratory: Negative for cough, sputum, wheezing, dyspnea  Gastrointestinal: Negative for nausea, vomiting, diarrhea, constipation, pain, heartburn  Genitourinary: Negative for dysuria, urinary frequency, hematuria  Musculoskeletal: As per HPI  Skin: Negative for skin rash, color changes  Neuro: Negative for weakness, numbness, tingling, loss of consciousness  Psych: Negative for anxiety, depression  Heme/Lymph: Negative for easy bruising, bleeding, lymphadenopathy          Past Medical History:   Diagnosis Date    Rheumatoid arthritis ([facility] [address] )        Past Surgical History:   Procedure Laterality Date    BRACHIOPLASTY         Social History     Socioeconomic History    Marital status: Single     Spouse name: Not on file    Number of children: Not on file    Years of education: Not on file    Highest education level: Not on file   Occupational History    Not on file   Tobacco Use    Smoking status: Never Smoker    Smokeless tobacco: Never Used   Vaping Use    Vaping Use: Never used   Substance and Sexual Activity    Alcohol use: Not Currently    Drug use: Not Currently    Sexual activity: Not Currently   Other Topics Concern    Not on file   Social History Narrative    Not on file     Social Determinants of Health     Financial Resource Strain: Not on file   Food Insecurity: Not on file   Transportation Needs: Not on file   Physical Activity: Inactive    Days of Exercise per Week: 0 days    Minutes of Exercise per Session: 0 min   Stress: No Stress Concern Present    Feeling of Stress : Not at all   Social Connections: Not on file   Intimate Partner Violence: Not on file   Housing Stability: Not on file       Family History   Problem Relation Age of Onset    Hypertension Mother No Known Allergies      Current Outpatient Medications:     Diclofenac Sodium (VOLTAREN) 1 %, Apply 2 g topically 4 (four) times a day as needed, Disp: , Rfl:     famciclovir (FAMVIR) 500 mg tablet, TAKE 2 TABLETS BY MOUTH IN THE AM AND 2 TABS BY MOUTH AT PM FOR ONE DAY THEN REPEAT AS NEEDED, Disp: , Rfl:     folic acid (FOLVITE) 1 mg tablet, Take 1 mg by mouth daily, Disp: , Rfl:     guaifenesin-codeine (GUAIFENESIN AC) 100-10 MG/5ML liquid, Virtussin AC 10 mg-100 mg/5 mL oral liquid, Disp: , Rfl:     medroxyPROGESTERone (Provera) 10 mg tablet, Daily, Disp: , Rfl:     methotrexate 2 5 mg tablet, TAKE 6 TABLETS BY MOUTH  ONCE A WEEK, Disp: 78 tablet, Rfl: 1    pantoprazole (PROTONIX) 40 mg tablet, TAKE 1 TABLET BY MOUTH DAILY BEFORE BREAKFAST, Disp: 90 tablet, Rfl: 0    predniSONE 5 mg tablet, Take 2 tablets daily as needed for a flare up , Disp: 60 tablet, Rfl: 2    sucralfate (CARAFATE) 1 g/10 mL suspension, Take 10 mL (1 g total) by mouth 4 (four) times a day, Disp: 420 mL, Rfl: 0    triamcinolone (KENALOG) 0 1 % ointment, APPLY TWICE A DAY AVOID FACE AND GROIN, Disp: , Rfl:       Objective:    Vitals:    02/22/22 1402   BP: 118/84   Pulse: (!) 54   Weight: 75 8 kg (167 lb)       Physical Exam  General: Well appearing, well nourished, in no distress  Oriented x 3, normal mood and affect  Ambulating without difficulty  Skin: Good turgor, no rash, unusual bruising or prominent lesions  Hair: Normal texture and distribution  Nails: Normal color, no deformities  HEENT:  Head: Normocephalic, atraumatic  Eyes: Conjunctiva clear, sclera non-icteric, EOM intact  Extremities: No amputations or deformities, cyanosis, edema  Musculoskeletal:  There is no peripheral joint soft tissue swelling or tenderness noted  Neurologic: Alert and oriented  No focal neurological deficits appreciated  Psychiatric: Normal mood and affect  CELESTINA Faye    Rheumatology

## 2022-02-22 NOTE — LETTER
February 22, 2022     Patient: Nadia Bazan   YOB: 1975   Date of Visit: 2/22/2022       To Whom it May Concern:    Nadia Bazan is under my professional care  She was seen in my office on 2/22/2022  She may return to work on 2/23/2022  If you have any questions or concerns, please don't hesitate to call           Sincerely,          Sidney Arredondo MD

## 2022-02-27 DIAGNOSIS — R10.9 STOMACH PAIN: ICD-10-CM

## 2022-02-27 RX ORDER — PANTOPRAZOLE SODIUM 40 MG/1
TABLET, DELAYED RELEASE ORAL
Qty: 90 TABLET | Refills: 0 | Status: SHIPPED | OUTPATIENT
Start: 2022-02-27 | End: 2022-06-04

## 2022-04-11 ENCOUNTER — RA CDI HCC (OUTPATIENT)
Dept: OTHER | Facility: HOSPITAL | Age: 47
End: 2022-04-11

## 2022-04-11 NOTE — PROGRESS NOTES
[facility]  coding opportunities       Chart reviewed, no opportunity found: CHART REVIEWED, NO OPPORTUNITY FOUND        Patients Insurance        Commercial Insurance: [employer] [place] oriented to person, place, time and situation

## 2022-06-04 DIAGNOSIS — R10.9 STOMACH PAIN: ICD-10-CM

## 2022-06-04 RX ORDER — PANTOPRAZOLE SODIUM 40 MG/1
TABLET, DELAYED RELEASE ORAL
Qty: 90 TABLET | Refills: 0 | Status: SHIPPED | OUTPATIENT
Start: 2022-06-04

## 2022-06-09 DIAGNOSIS — M05.9 SEROPOSITIVE RHEUMATOID ARTHRITIS (HCC): ICD-10-CM

## 2022-06-09 RX ORDER — PREDNISONE 1 MG/1
TABLET ORAL
Qty: 60 TABLET | Refills: 0 | Status: SHIPPED | OUTPATIENT
Start: 2022-06-09 | End: 2022-07-17

## 2022-07-17 DIAGNOSIS — M05.9 SEROPOSITIVE RHEUMATOID ARTHRITIS (HCC): ICD-10-CM

## 2022-07-17 RX ORDER — PREDNISONE 1 MG/1
TABLET ORAL
Qty: 60 TABLET | Refills: 0 | Status: SHIPPED | OUTPATIENT
Start: 2022-07-17 | End: 2022-08-17 | Stop reason: SDUPTHER

## 2022-08-12 ENCOUNTER — TELEPHONE (OUTPATIENT)
Dept: OBGYN CLINIC | Facility: HOSPITAL | Age: 47
End: 2022-08-12

## 2022-08-12 DIAGNOSIS — M05.9 SEROPOSITIVE RHEUMATOID ARTHRITIS (HCC): ICD-10-CM

## 2022-08-12 NOTE — TELEPHONE ENCOUNTER
Pt contacted Call Center requested refill of their medication  Medication Name: methotrexate     Dosage of Med:  2 5 mg tablet        Frequency of Med: TAKE 6 TABLETS BY MOUTH [address] [facility] and Location: [facility]/PHARMACY #[id number] [facility]        Pt   Preferred Callback Phone Number: [phone]

## 2022-08-17 DIAGNOSIS — M05.9 SEROPOSITIVE RHEUMATOID ARTHRITIS (HCC): ICD-10-CM

## 2022-08-17 RX ORDER — PREDNISONE 1 MG/1
TABLET ORAL
Qty: 60 TABLET | Refills: 0 | Status: SHIPPED | OUTPATIENT
Start: 2022-08-17